# Patient Record
Sex: FEMALE | Race: WHITE | NOT HISPANIC OR LATINO | Employment: UNEMPLOYED | URBAN - METROPOLITAN AREA
[De-identification: names, ages, dates, MRNs, and addresses within clinical notes are randomized per-mention and may not be internally consistent; named-entity substitution may affect disease eponyms.]

---

## 2022-12-01 ENCOUNTER — TRANSFERRED RECORDS (OUTPATIENT)
Dept: HEALTH INFORMATION MANAGEMENT | Facility: CLINIC | Age: 62
End: 2022-12-01

## 2023-03-03 ENCOUNTER — MEDICAL CORRESPONDENCE (OUTPATIENT)
Dept: HEALTH INFORMATION MANAGEMENT | Facility: CLINIC | Age: 63
End: 2023-03-03
Payer: COMMERCIAL

## 2023-03-06 ENCOUNTER — TRANSCRIBE ORDERS (OUTPATIENT)
Dept: OTHER | Age: 63
End: 2023-03-06

## 2023-03-06 DIAGNOSIS — R10.9 ABDOMINAL PAIN: Primary | ICD-10-CM

## 2023-03-08 ENCOUNTER — TELEPHONE (OUTPATIENT)
Dept: GASTROENTEROLOGY | Facility: CLINIC | Age: 63
End: 2023-03-08
Payer: COMMERCIAL

## 2023-03-08 NOTE — TELEPHONE ENCOUNTER
Health Call Center    Phone Message    May a detailed message be left on voicemail: yes     Reason for Call: Appointment Intake    Referring Provider Name: Tri Capellan MD  Diagnosis and/or Symptoms: Abdominal pain [R10.9]    Per triage notes, patient is to be seen as a GI Urgent, but is currently scheduled on 04/19/2023 with Dr. Prachi Posadas. Current appointment is outside requested time frame. Please review for further follow up per scheduling guidelines. Thanks!     Action Taken: Message routed to:  [facility] ([facility] GI    Travel Screening: Not Applicable

## 2023-03-09 NOTE — TELEPHONE ENCOUNTER
Pt called back. Pt is now rescheduled to see Tamika Grewal on 3/17/2023 at 8am for an in-person clinic visit.

## 2023-03-09 NOTE — TELEPHONE ENCOUNTER
REFERRAL INFORMATION:    Referring Provider:  Dr. Tri Capellan    Referring Clinic:  [facility]    Reason for Visit/Diagnosis: abd pain     FUTURE VISIT INFORMATION:    Appointment Date: 04-19-23    Appointment Time: @ 10am      NOTES STATUS DETAILS   OFFICE NOTE from Referring Provider Care Everywhere - [facility] 03-06-23 Dr. Tri Capellan   OFFICE NOTE from Other Specialist N/A    HOSPITAL DISCHARGE SUMMARY/  ED VISITS Care everywhere - [facility] 03-02-23 Dr. Ang Whitt  03-02-23 Dr. Sheeba Rubin  01-28-23 Dr. Kristina Gottlieb   OPERATIVE REPORT N/A    MEDICATION LIST Internal         ENDOSCOPY  Care everywhere - [facility] EGD: 06-16-22   COLONOSCOPY Care everywhere - [facility] 04-12-22   ERCP N/A    EUS N/A    STOOL TESTING Care everywhere - epic 09-19-19   PERTINENT LABS Care everywhere - epic C-diff 09-02-20, 08-06-20, 09-19-19, 09-18-19   PATHOLOGY REPORTS (RELATED) Care everywhere - epic 12-01-22, 06-16-22   IMAGING (CT, MRI, EGD, MRCP, Small Bowel Follow Through/SBT, MR/CT Enterography) Care everywhere - [facility] EGD: 06-16-22  CT abd: 03-02-23, 06-09-22 03-09-23 requested images to be pushed from [facility] @ 11:22am   3/15/23 9AM received images in PACS - [facility]

## 2023-03-16 NOTE — PROGRESS NOTES
GI CLINIC VISIT    CC/REFERRING MD:  Tri Capellan  REASON FOR CONSULTATION: abdominal pain    ASSESSMENT/PLAN:  #Abdminal Pain  #Chronic gastritis with intestinal metaplasia (30% involvement overall) and focal activity. Hpylori negative  #Irregular bowel pattern  Pt with intermittent mid-abdominal/left sided abdominal pain with two episodes of significant worsening prompting ED visits.  Notes that abdominal pain started around time bowel pattern changed after previous colonoscopy which was normal though patient was told she had a tortuous colon.  No alternating between hard and liquid stools with feeling of incomplete evacuation.  Work-up done by primary including EGD which showed erythema in the gastric antrum with mucosal prominence/abnormality and hiatal hernia, esophagus and duodenum were noraml. Biopsies showed chronic intestinal metaplasia (30% involvement), negative for h. Pylori, no duodenal abnornalities (celiac). CT done 6/2022 which did not show acute abnormalities. She had gastric emptying study 7/2022 which was normal.  She underwent cholecystectomy in 12/1/2022 as this was thought to be contributing to her pain after HIDA showed potential biliary dyskinesia with gallbladder EF of 26%, though no change in abdominal pain after this procedure.  During ED visit lipase and LFTs were normal though mild biliary dilation was noted on CT without any other acute abdominal abnormalities.  Differential for symptoms include hypersensitivity with chronic gastritis, bile acid reflux, functional dyspepsia, high stool burden with potential overflow, IBS-M versus IBS-C, versus possible retained stone though less likely given normal LFTs.  We will start with increasing omeprazole to 40 mg twice daily to see if this assists with pain.  We will also complete EGD with mapping for intestinal metaplasia seen (H. pylori was negative, no frequent NSAID use).  Additionally we will try to regulate bowel pattern with start of  soluble fiber supplementation per below.  Lastly I will reach out to the pancreatic and biliary team regarding CT findings and any other potential work-up.  Future considerations include CTA to assess for potential ischemic disease though lower suspicion, lactose-free trial, SIBO breath testing, trial of neuromodulation with TCA (currently taking duloxetine and bupropion which have been helpful for mood but not pain - collaborate with prescribing provider prior to starting).    Plan:  -- Cut back on soda  -- Increase omeprazole to 40 mg twice daily  -- Continue dicyclomine as needed with meals  -- Start soluble fiber supplementation with Citrucel powder - this should be taken daily. Start with 1/2 tablespoon mixed with large glass of water. Slowly increase dose by 1/2 tablespoon every 1-2 weeks until desired stool consistency is achieved (up to 2-3 tablespoons per day). For example start with 1/2 tablespoon, then after a week incease to 1 tablespoon, then 1 1/2 tablespoons, then 2 tablespoons, etc.  -- schedule uppper endoscopy  -- Talk to panc bili team      Colorectal cancer screening: last colonoscopy 2022 normal, repeat in 10 years (2032) for screening purposes    RTC 3 months    Thank you for this consultation.  It was a pleasure to participate in the care of this patient; please contact us with any further questions.     85 Minutes was spent on the date of the encounter during chart review, history and exam, documentation, and further activities as noted       Tamika Grewal PA-C, RD  Division of Gastroenterology, Hepatology & Nutrition  [facility]        HPI  Velma Hendricks is a 63 year old female with a history of HTN, arthralgias, spinal stenosis with chronic back pain, depression, biliary dyskinesia s/p cholecystectomy (121/1/22)  who presents for evaluation of abdominal pain. They are new to the [facility] GI clinic and this is my first encounter with the patient.     Ms. Hendricks reports that she  "started having abdominal pain in mid to left abdomen without radiation in April 2022.  Described as intermittent, crampy/achy pain that would last 30 minutes to a couple hours and subside.  She could go days without experiencing pain, but sometimes has up to 2-3 episodes of pain within a day.  No association with food or bowel movements.  No aggravating or alleviating factors.  She notes that this started at time of screening colonoscopy in April.  Colonoscopy was normal, no evidence of inflammation or dysplasia, no biopsies were collected.  She does note that the endoscopist commented that she had a tortuous colon.  For further work up of pain she had EGD 6/2022 which showed erythema in the gastric antrum with mucosal prominence/abnormality and hiatal hernia, esophagus and duodenum were noraml. Biopsies showed chronic intestinal metaplasia (30% involvement), negative for h. Pylori, no duodenal abnornalities (celiac). CT done 6/2022 which did not show acute abnormalities. She had gastric emptying study 7/2022 which was normal.  She underwent cholecystectomy in 12/1/2022 as this was thought to be contributing to her pain after HIDA showed potential biliary dyskinesia with gallbladder EF of 26%.  Reports no change in abdominal pain after cholecystectomy.    She has had 2 episodes of more severe abdominal pain prompting ED visit in January and then just a few weeks ago.  This is in the same location and mid abdomen but more sharp in nature and feels like she had a \"punch to the stomach\". No radiation or association with eating/stools. These episodes are associated with nausea. CT imaging was unrevealing for source of symptoms, did show mild dilatation of the common bile duct and central intrahepatic bile ducts, thought to be secondary to the prior cholecystectomy given normal liver function tests, otherwise normal liver. Pain improved with droperidol in January, compazine during recent ED visit.    Currently taking " dicyclomine TID and omeprazole 20 mg BID without much improvement.    She notes that she did have a change in her bowel pattern around the time that the abdominal pain started.  Previously would have soft-hard formed stool 1-2 times per day, then started having more variable stool consistency between hard stools to mush/liquid.  Currently having 2 stools per day, Pierson stool 2-7.  Sometimes stools are urgent but denies incontinence or fecal leakage.  Typically does not feel empty with bowel movements, when she does feel fully evacuated after a large stool this will sometimes immediately induce more abdominal cramping.  Denies hematochezia or melena.   Cholestryramine tried for loose stools with primary without change.    Endorses intermittent bloating and excess flatus. No eructation. No GERD symptoms. No dysphagia or odynophagia. No hematemesis, hematochezia, or melena.    Denies use of NSAIDs. Notes that she drinks 3-4 can of coca cola  per day.     Reports weight loss 140 lb (3.5 years) -- > 117 lb in past 3.5 years with low weight of 112 lb, recently stable.       ROS:    No fevers or chills  + weight loss  No blurry vision, double vision or change in vision  No sore throat  No lymphadenopathy  No headache, paraesthesias, or weakness in a limb  No shortness of breath or wheezing  No chest pain or pressure  + arthralgias or myalgias  No rashes or skin changes  No odynophagia or dysphagia  No BRBPR, hematochezia, melena  No dysuria, frequency or urgency  No hot/cold intolerance or polyria  No anxiety or depression    PROBLEM LIST  There are no problems to display for this patient.      PERTINENT PAST MEDICAL HISTORY:  HTN  Arthralgia  Depression  Spinal stenosis, chronic back pain    No past medical history on file.    PREVIOUS SURGERIES:  Cholecystectomy   Back surgery x 3   x 3  No past surgical history on file.    PREVIOUS ENDOSCOPY:  EGD 22  Findings:   No gross lesions were noted in the upper  third of the esophagus, in the middle third of the esophagus and in the lower third of the esophagus.   A hiatal hernia was present.   Localized mild inflammation characterized by erythema was found in the gastric antrum. There was some mucosal prominence/abnormality in the gastric antrum which was biopsied with a cold biopsy forceps for histology.   No gross lesions were noted in the second portion of the duodenum.   There was a lesion/mucosal abnormality in the duodenal bulb which was biopsied with a cold biopsy forceps for histology.   Impression:              - No gross lesions in the upper third of the esophagus, in the middle third of the esophagus and in the lower third of the esophagus.   - Hiatal hernia.   - Gastritis/mucosal abnormality in the gastric antrum. Biopsied.   - No gross lesions in the second portion of the duodenum even though there was a lesion in the duodenal bulb which was biopsied    A.  Small bowel, duodenum, bulb, biopsy:    No diagnostic abnormality  B.  Stomach, antrum, biopsy:    Chronic gastritis with intestinal metaplasia (30% involvement overall) and focal activity    Negative for Helicobacter pylori by H. pylori immunohistochemical study    Colonoscopy 4/12/22:  Findings:   The entire examined colon appeared normal on direct and retroflexion views.   Impression:              - The entire examined colon is normal on direct and retroflexion views.   - No specimens collected.    ALLERGIES:   Not on File    PERTINENT MEDICATIONS:  No current outpatient medications on file.  No other NSAID/anticoagulation reported by patient.  No other OTC/herbal/supplements reported by patient.  Multivitamin    SOCIAL HISTORY:  Tobacco: no  Alcohol: no  Drugs Use: no      Social History     Socioeconomic History     Marital status:      Spouse name: Not on file     Number of children: Not on file     Years of education: Not on file     Highest education level: Not on file   Occupational History      Not on file   Tobacco Use     Smoking status: Not on file     Smokeless tobacco: Not on file   Substance and Sexual Activity     Alcohol use: Not on file     Drug use: Not on file     Sexual activity: Not on file   Other Topics Concern     Not on file   Social History Narrative     Not on file     Social Determinants of Health     Financial Resource Strain: Not on file   Food Insecurity: Not on file   Transportation Needs: Not on file   Physical Activity: Not on file   Stress: Not on file   Social Connections: Not on file   Intimate Partner Violence: Not on file   Housing Stability: Not on file       FAMILY HISTORY:  FH of CRC: no  FH of IBD: no  No Colon/Panc/Esophageal/other GI CA. No IBD or Celiac Disease. No other Autoimmune, Liver, or Thyroid disease.    No family history on file.    Past/family/social history reviewed and no changes    PHYSICAL EXAMINATION:  Constitutional: AAOx3, cooperative, pleasant, not dyspneic/diaphoretic, no acute distress  Vitals reviewed: There were no vitals taken for this visit.  Wt:   Wt Readings from Last 2 Encounters:   No data found for Wt      Eyes: Sclera anicteric/injected  Ears/nose/mouth/throat: Normal oropharynx without ulcers or exudate, mucus membranes moist, hearing intact  Neck: supple, thyroid normal size  CV: No edema  Respiratory: Unlabored breathing  Lymph: No axillary, submandibular, supraclavicular or inguinal lymphadenopathy  Abd: Nondistended, +bs, no hepatosplenomegaly, slightly tender to light and deep palpation in LLQ, no peritoneal signs  Skin: warm, perfused, no jaundice  Psych: Normal affect  MSK: Normal gait      PERTINENT STUDIES:  CT A/P (w/ contrast) 3/6/23:  FINDINGS:   LOWER CHEST: Benign calcified granulomas in the left lung base and minimal atelectasis   .   HEPATOBILIARY: Increase in mild dilatation of the common bile duct and central intrahepatic bile ducts which may be secondary to the prior cholecystectomy given the recent normal liver  function tests. Otherwise normal liver.     PANCREAS: Normal.     SPLEEN: Normal.     ADRENAL GLANDS: Mild thickening of left adrenal gland without suspicious adrenal nodule is stable.     KIDNEYS/BLADDER: Kidneys are partially obscured posteriorly due to metallic artifact related to the hip arthroplasties. No suspicious renal lesion or hydronephrosis. Normal bladder.     BOWEL: The bowel is normal in caliber and wall thickness. No inflammatory changes or ascites. Normal retrocecal appendix. Tiny fat-containing umbilical hernia.     LYMPH NODES: Normal.     VASCULATURE: Moderate aortic calcifications without aneurysmal dilatation.     PELVIC ORGANS: There is present. No pelvic mass or pelvic fluid collection is seen.     MUSCULOSKELETAL: Mild scoliosis and degenerative changes of the spine predominantly involving the facets with multilevel posterior metallic fusion and vertebroplasty changes. Postoperative changes of the iliac bones with screw tracts.     IMPRESSION:   1.  No acute abnormality identified in the abdomen or pelvis. No definitive findings to explain abdominal pain.     2.  Mild prominence of the central intrahepatic bile ducts and common bile duct has increased from previous imaging although may be due to the interval cholecystectomy given the recent normal liver function tests.      Gastric Emptying Study 7/13/22:  FINDINGS: The stomach is scintigraphically normal. No evidence of gastroesophageal reflux.     PATIENT'S RETENTION: 1 hour = 77%, 2 hours = 42%, 4 hours = 0%   NORMAL RETENTION: 1 hour = <90%, 2 hours = <60%, 4 hours = <10%     IMPRESSION:   Normal gastric emptying.  HIDA Scan 7/11/22:  FINDINGS: Normal radionuclide activity in liver, gallbladder, bile ducts, and small bowel. No evidence of cystic or common duct obstruction or intrinsic liver disease. Gallbladder ejection fraction measures 26%, which is below the normal range of 35% or greater.     IMPRESSION:     Findings suspicious for  biliary dyskinesia.

## 2023-03-17 ENCOUNTER — OFFICE VISIT (OUTPATIENT)
Dept: GASTROENTEROLOGY | Facility: CLINIC | Age: 63
End: 2023-03-17
Attending: FAMILY MEDICINE
Payer: COMMERCIAL

## 2023-03-17 VITALS
BODY MASS INDEX: 22.22 KG/M2 | SYSTOLIC BLOOD PRESSURE: 173 MMHG | HEIGHT: 61 IN | HEART RATE: 85 BPM | DIASTOLIC BLOOD PRESSURE: 90 MMHG | OXYGEN SATURATION: 100 % | WEIGHT: 117.7 LBS

## 2023-03-17 DIAGNOSIS — R10.84 ABDOMINAL PAIN, GENERALIZED: Primary | ICD-10-CM

## 2023-03-17 DIAGNOSIS — R19.8 IRREGULAR BOWEL HABITS: ICD-10-CM

## 2023-03-17 DIAGNOSIS — K31.A11 INTESTINAL METAPLASIA OF ANTRUM OF STOMACH WITHOUT DYSPLASIA: ICD-10-CM

## 2023-03-17 PROCEDURE — 99417 PROLNG OP E/M EACH 15 MIN: CPT | Performed by: DIETITIAN, REGISTERED

## 2023-03-17 PROCEDURE — 99205 OFFICE O/P NEW HI 60 MIN: CPT | Performed by: DIETITIAN, REGISTERED

## 2023-03-17 RX ORDER — LISINOPRIL 20 MG/1
20 TABLET ORAL
COMMUNITY
Start: 2022-11-22

## 2023-03-17 RX ORDER — BUPROPION HYDROCHLORIDE 150 MG/1
1 TABLET, EXTENDED RELEASE ORAL
COMMUNITY
Start: 2023-03-03

## 2023-03-17 RX ORDER — DULOXETIN HYDROCHLORIDE 30 MG/1
2 CAPSULE, DELAYED RELEASE ORAL DAILY
COMMUNITY
Start: 2021-04-14 | End: 2023-11-30

## 2023-03-17 RX ORDER — TRAZODONE HYDROCHLORIDE 50 MG/1
50 TABLET, FILM COATED ORAL
COMMUNITY
Start: 2021-05-05

## 2023-03-17 RX ORDER — DOXYCYCLINE 100 MG/1
100 CAPSULE ORAL
COMMUNITY
Start: 2022-07-25

## 2023-03-17 RX ORDER — DICYCLOMINE HYDROCHLORIDE 10 MG/1
CAPSULE ORAL
COMMUNITY
Start: 2022-08-02

## 2023-03-17 ASSESSMENT — PAIN SCALES - GENERAL: PAINLEVEL: NO PAIN (0)

## 2023-03-17 NOTE — NURSING NOTE
"Chief Complaint   Patient presents with     New Patient       Vitals:    03/17/23 0751   BP: (!) 173/90   Pulse: 85   SpO2: 100%   Weight: 53.4 kg (117 lb 11.2 oz)   Height: 1.549 m (5' 1\")       Body mass index is 22.24 kg/m .    Madelyn Rosa MA    "

## 2023-03-17 NOTE — PATIENT INSTRUCTIONS
It was a pleasure taking care of you today.  I've included a brief summary of our discussion and care plan from today's visit below.  Please review this information with your primary care provider.  ______________________________________________________________________    My recommendations are summarized as follows:  -- Increase omeprazole to 40 mg twice daily  -- Continue dicyclomine as needed with meals  -- Start soluble fiber supplementation with Citrucel or Metamucil powder - this should be taken daily. Start with 1/2 tablespoon mixed with large glass of water. Slowly increase dose by 1/2 tablespoon every 1-2 weeks until desired stool consistency is achieved (up to 2-3 tablespoons per day). For example start with 1/2 tablespoon, then after a week incease to 1 tablespoon, then 1 1/2 tablespoons, then 2 tablespoons, etc.  -- Schedule upper endoscopy    -- please see scheduling information provided below     Return to GI Clinic in 3 months to review your progress.    ______________________________________________________________________    How do I schedule labs, imaging studies, or procedures that were ordered in clinic today?     Labs: To schedule lab appointment at the [facility], use my chart or call [phone]. If you have a [facility] lab closer to home where you are regularly seen you can give them a call.     Procedures: If a colonoscopy, upper endoscopy, breath test, esophageal manometry, or pH impedence was ordered today, our endoscopy team will call you to schedule this. If you have not heard from our endoscopy team within a week, please call [phone] to schedule.     Imaging Studies: If you were scheduled for a CT scan, X-ray, MRI, ultrasound, HIDA scan or other imaging study, please call [phone] to have this scheduled.     Referral: If a referral to another specialty was ordered, expect a phone call or follow instructions above. If you have not heard from anyone regarding  your referral in a week, please call our clinic to check the status.     Who do I call with any questions after my visit?  Please be in touch if there are any further questions that arise following today's visit.  There are multiple ways to contact your gastroenterology care team.      During business hours, you may reach a Gastroenterology nurse at [phone]    To schedule or reschedule an appointment, please call [phone].     You can always send a secure message through [employer]Park.  [employer] messages are answered by your nurse or doctor typically within 24 hours.  Please allow extra time on weekends and holidays.      For urgent/emergent questions after business hours, you may reach the on-call GI Fellow by contacting the [facility]  at [phone].     How will I get the results of any tests ordered?    You will receive all of your results.  If you have signed up for [employer]It, any tests ordered at your visit will be available to you after your provider reviews them.  Typically this takes 1-2 weeks.  If there are urgent results that require a change in your care plan, your provider or nurse will call you to discuss the next steps.      What is [employer]Park?  [employer]Park is a secure way for you to access all of your healthcare records from the [facility].  It is a web based computer program, so you can sign on to it from any location.  It also allows you to send secure messages to your care team.  I recommend signing up for PickPark access if you have not already done so and are comfortable with using a computer.      How to I schedule a follow-up visit?  If you did not schedule a follow-up visit today, please call [phone] to schedule a follow-up office visit.      Sincerely,    Tamika Grewal PA-C  Division of Gastroenterology, Hepatology & Nutrition  [facility]

## 2023-03-28 ENCOUNTER — TELEPHONE (OUTPATIENT)
Dept: GASTROENTEROLOGY | Facility: CLINIC | Age: 63
End: 2023-03-28
Payer: COMMERCIAL

## 2023-03-28 NOTE — TELEPHONE ENCOUNTER
Screening Questions  BLUE  KIND OF PREP RED  LOCATION [review exclusion criteria] GREEN  SEDATION TYPE        N Are you active on mychart?       MIKE NICHOLSON Ordering/Referring Provider?        MEDICARE  What type of coverage do you have?     N Have you had a positive covid test in the last 14 days?     22.5 1. BMI  [BMI 40+ - review exclusion criteria]    Y  2. Are you able to give consent for your medical care? [IF NO,RN REVIEW]          N  3. Are you taking any prescription pain medications on a routine schedule   (ex narcotics: oxycodone, roxicodone, oxycontin,  and percocet)? [RN Review]          3a. EXTENDED PREP What kind of prescription?     N 4. Do you have any chemical dependencies such as alcohol, street drugs, or methadone?        **If yes 3- 5 , please schedule with MAC sedation.**          IF YES TO ANY 6 - 10 - HOSPITAL SETTING ONLY.     N 6.   Do you need assistance transferring?     N 7.   Have you had a heart or lung transplant?    N 8.   Are you currently on dialysis?   N 9.   Do you use daily home oxygen?   N 10. Do you take nitroglycerin?   10a.  If yes, how often?     11. [FEMALES]  N Are you currently pregnant?    11a.  If yes, how many weeks? [ Greater than 12 weeks, OR NEEDED]    N 12. Do you have Pulmonary Hypertension? *NEED PAC APPT AT [facility] w/ MAC*     N 13. [review exclusion criteria]  Do you have any implantable devices in your body (pacemaker, defib, LVAD)?    N 14. In the past 6 months, have you had any heart related issues including cardiomyopathy or heart attack?     14a.  If yes, did it require cardiac stenting if so when?     N 15. Have you had a stroke or Transient ischemic attack (TIA - aka  mini stroke ) within 6 months?      N 16. Do you have mod to severe Obstructive Sleep Apnea?  [Hospital only]    N 17. Do you have SEVERE AND UNCONTROLLED asthma? *NEED PAC APPT AT [facility] w/MAC*     18. Are you currently taking any blood thinners?     18a. No. Continue to 19.   18b.  "Yes/no Blood Thinner: No [CONTINUE TO #19]    N 19. Do you take the medication Phentermine?    19a. If yes, \"Hold for 7 days before procedure.  Please consult your prescribing provider if you have questions about holding this medication.\"     N  20. Do you have chronic kidney disease?      N  21. Do you have a diagnosis of diabetes?     N  22. On a regular basis do you go 3-5 days between bowel movements?      23. Preferred LOCAL Pharmacy for Pre Prescription    [ LIST ONLY ONE PHARMACY]     [facility] [id number] [place] [address]        - CLOSING REMINDERS -    Informed patient they will need an adult    Cannot take any type of public or medical transportation alone    Conscious Sedation- Needs  for 6 hours after the procedure       MAC/General-Needs  for 24 hours after procedure    Pre-Procedure Covid test to be completed [[facility] PCR Testing Required]    Confirmed Nurse will call to complete assessment       - SCHEDULING DETAILS -  N Hospital Setting Required? If yes, what is the exclusion?:    ZAY  Surgeon    05/18/2023  Date of Procedure  Upper Endoscopy [EGD]  Type of Procedure Scheduled  [facility]   Which Colonoscopy Prep was Sent?     CS Sedation Type     N PAC / Pre-op Required               "

## 2023-04-19 ENCOUNTER — PRE VISIT (OUTPATIENT)
Dept: GASTROENTEROLOGY | Facility: CLINIC | Age: 63
End: 2023-04-19

## 2023-05-04 ENCOUNTER — TELEPHONE (OUTPATIENT)
Dept: GASTROENTEROLOGY | Facility: CLINIC | Age: 63
End: 2023-05-04
Payer: COMMERCIAL

## 2023-05-04 NOTE — TELEPHONE ENCOUNTER
Attempted to contact patient regarding upcoming Upper endoscopy (EGD) procedure on 5/18/23 for pre assessment questions. No answer.     Left message to return call to [phone] #4    Discuss Covid policy and designated  policy.    Pre op exam? N/A    Arrival time: 0945. Procedure time: 1045    [facility]; [address] [place] [address], [address], [place] [address]    Sedation type: Conscious sedation     NSAIDs? No NSAID medications per patient's medication list.  RN will verify with pre-assessment call.    Anticoagulants: No    Electronic implanted devices? No    Diabetic? No    Indication for procedure: chronic abdominal pain, intestinal metaplasia- needs f/u mapping    Prep instructions previously sent via letter by scheduling team.       Sarika Tomas RN  Endoscopy Procedure Pre Assessment RN

## 2023-05-11 NOTE — TELEPHONE ENCOUNTER
Second attempt for pre-assessment prior to upcoming upper endoscopy (EGD) on 5.18.23     No answer.  Left message to return call [phone] #4    Natalie Jimenez RN  Endoscopy Procedure Pre Assessment RN

## 2023-05-11 NOTE — TELEPHONE ENCOUNTER
Pre assessment questions completed for upcoming Upper endoscopy (EGD) procedure scheduled on 5/18/23    COVID policy reviewed.     Pre-op exam? N/A    Reviewed procedural arrival time 0945, procedure time 1045 and facility location [facility]; [address] [place] [address], [address], [place] [address]    Designated  policy reviewed. Instructed to have someone stay 6 hours post procedure.     NSAIDs? Yes.  Ibuprofen (Advil, Motrin).  Holding interval of 1 day before procedure.    Anticoagulation/blood thinners? No    Electronic implanted devices? No    Diabetic? No    Reviewed procedure prep instructions.     Patient verbalized understanding and had no questions or concerns at this time.    Caro He, RN  Endoscopy Procedure Pre Assessment RN

## 2023-05-18 ENCOUNTER — ANESTHESIA EVENT (OUTPATIENT)
Dept: SURGERY | Facility: AMBULATORY SURGERY CENTER | Age: 63
End: 2023-05-18
Payer: COMMERCIAL

## 2023-05-18 ENCOUNTER — ANESTHESIA (OUTPATIENT)
Dept: SURGERY | Facility: AMBULATORY SURGERY CENTER | Age: 63
End: 2023-05-18
Payer: COMMERCIAL

## 2023-05-18 ENCOUNTER — HOSPITAL ENCOUNTER (OUTPATIENT)
Facility: AMBULATORY SURGERY CENTER | Age: 63
Discharge: HOME | End: 2023-05-18
Attending: STUDENT IN AN ORGANIZED HEALTH CARE EDUCATION/TRAINING PROGRAM
Payer: COMMERCIAL

## 2023-05-18 VITALS
HEIGHT: 60 IN | WEIGHT: 120 LBS | OXYGEN SATURATION: 98 % | RESPIRATION RATE: 16 BRPM | BODY MASS INDEX: 23.56 KG/M2 | TEMPERATURE: 97 F | HEART RATE: 108 BPM | SYSTOLIC BLOOD PRESSURE: 140 MMHG | DIASTOLIC BLOOD PRESSURE: 78 MMHG

## 2023-05-18 VITALS — HEART RATE: 117 BPM

## 2023-05-18 LAB — UPPER GI ENDOSCOPY: NORMAL

## 2023-05-18 PROCEDURE — 43239 EGD BIOPSY SINGLE/MULTIPLE: CPT

## 2023-05-18 PROCEDURE — 88305 TISSUE EXAM BY PATHOLOGIST: CPT | Mod: TC | Performed by: STUDENT IN AN ORGANIZED HEALTH CARE EDUCATION/TRAINING PROGRAM

## 2023-05-18 PROCEDURE — 88305 TISSUE EXAM BY PATHOLOGIST: CPT | Mod: 26 | Performed by: PATHOLOGY

## 2023-05-18 RX ORDER — ONDANSETRON 2 MG/ML
4 INJECTION INTRAMUSCULAR; INTRAVENOUS
Status: DISCONTINUED | OUTPATIENT
Start: 2023-05-18 | End: 2023-05-18 | Stop reason: HOSPADM

## 2023-05-18 RX ORDER — ONDANSETRON 2 MG/ML
4 INJECTION INTRAMUSCULAR; INTRAVENOUS EVERY 6 HOURS PRN
Status: DISCONTINUED | OUTPATIENT
Start: 2023-05-18 | End: 2023-05-19 | Stop reason: HOSPADM

## 2023-05-18 RX ORDER — PROPOFOL 10 MG/ML
INJECTION, EMULSION INTRAVENOUS CONTINUOUS PRN
Status: DISCONTINUED | OUTPATIENT
Start: 2023-05-18 | End: 2023-05-18

## 2023-05-18 RX ORDER — KETAMINE HYDROCHLORIDE 10 MG/ML
INJECTION INTRAMUSCULAR; INTRAVENOUS PRN
Status: DISCONTINUED | OUTPATIENT
Start: 2023-05-18 | End: 2023-05-18

## 2023-05-18 RX ORDER — FLUMAZENIL 0.1 MG/ML
0.2 INJECTION, SOLUTION INTRAVENOUS
Status: ACTIVE | OUTPATIENT
Start: 2023-05-18 | End: 2023-05-18

## 2023-05-18 RX ORDER — NALOXONE HYDROCHLORIDE 0.4 MG/ML
0.2 INJECTION, SOLUTION INTRAMUSCULAR; INTRAVENOUS; SUBCUTANEOUS
Status: DISCONTINUED | OUTPATIENT
Start: 2023-05-18 | End: 2023-05-19 | Stop reason: HOSPADM

## 2023-05-18 RX ORDER — LIDOCAINE 40 MG/G
CREAM TOPICAL
Status: DISCONTINUED | OUTPATIENT
Start: 2023-05-18 | End: 2023-05-18 | Stop reason: HOSPADM

## 2023-05-18 RX ORDER — PROPOFOL 10 MG/ML
INJECTION, EMULSION INTRAVENOUS PRN
Status: DISCONTINUED | OUTPATIENT
Start: 2023-05-18 | End: 2023-05-18

## 2023-05-18 RX ORDER — NALOXONE HYDROCHLORIDE 0.4 MG/ML
0.4 INJECTION, SOLUTION INTRAMUSCULAR; INTRAVENOUS; SUBCUTANEOUS
Status: DISCONTINUED | OUTPATIENT
Start: 2023-05-18 | End: 2023-05-19 | Stop reason: HOSPADM

## 2023-05-18 RX ORDER — PROCHLORPERAZINE MALEATE 10 MG
10 TABLET ORAL EVERY 6 HOURS PRN
Status: DISCONTINUED | OUTPATIENT
Start: 2023-05-18 | End: 2023-05-19 | Stop reason: HOSPADM

## 2023-05-18 RX ORDER — ONDANSETRON 4 MG/1
4 TABLET, ORALLY DISINTEGRATING ORAL EVERY 6 HOURS PRN
Status: DISCONTINUED | OUTPATIENT
Start: 2023-05-18 | End: 2023-05-19 | Stop reason: HOSPADM

## 2023-05-18 RX ORDER — SODIUM CHLORIDE, SODIUM LACTATE, POTASSIUM CHLORIDE, CALCIUM CHLORIDE 600; 310; 30; 20 MG/100ML; MG/100ML; MG/100ML; MG/100ML
INJECTION, SOLUTION INTRAVENOUS CONTINUOUS PRN
Status: DISCONTINUED | OUTPATIENT
Start: 2023-05-18 | End: 2023-05-18

## 2023-05-18 RX ADMIN — KETAMINE HYDROCHLORIDE 20 MG: 10 INJECTION INTRAMUSCULAR; INTRAVENOUS at 11:01

## 2023-05-18 RX ADMIN — PROPOFOL 150 MCG/KG/MIN: 10 INJECTION, EMULSION INTRAVENOUS at 11:01

## 2023-05-18 RX ADMIN — PROPOFOL 40 MG: 10 INJECTION, EMULSION INTRAVENOUS at 11:05

## 2023-05-18 RX ADMIN — SODIUM CHLORIDE, SODIUM LACTATE, POTASSIUM CHLORIDE, CALCIUM CHLORIDE: 600; 310; 30; 20 INJECTION, SOLUTION INTRAVENOUS at 10:58

## 2023-05-18 RX ADMIN — PROPOFOL 40 MG: 10 INJECTION, EMULSION INTRAVENOUS at 11:00

## 2023-05-18 NOTE — ANESTHESIA POSTPROCEDURE EVALUATION
Patient: Velma Hendricks    Procedure: Procedure(s):  Esophagoscopy, gastroscopy, duodenoscopy (EGD), combined       Anesthesia Type:  MAC    Note:  Disposition: Outpatient   Postop Pain Control: Uneventful            Sign Out: Well controlled pain   PONV: No   Neuro/Psych: Uneventful            Sign Out: Acceptable/Baseline neuro status   Airway/Respiratory: Uneventful            Sign Out: Acceptable/Baseline resp. status   CV/Hemodynamics: Uneventful            Sign Out: Acceptable CV status; No obvious hypovolemia; No obvious fluid overload   Other NRE: NONE   DID A NON-ROUTINE EVENT OCCUR?            Last vitals:  Vitals Value Taken Time   /78 05/18/23 1150   Temp 36.1  C (97  F) 05/18/23 1150   Pulse 108 05/18/23 1123   Resp 16 05/18/23 1150   SpO2 98 % 05/18/23 1150       Electronically Signed By: Fredo Rich MD  May 18, 2023  2:01 PM

## 2023-05-18 NOTE — H&P
Velma Hendricks  [id number]  female  63 year old      Reason for procedure/surgery: EGD for abdominal pain, follow up intestinal metaplasia    There is no problem list on file for this patient.      Past Surgical History:  No past surgical history on file.    Past Medical History: No past medical history on file.    Social History:   Social History     Tobacco Use     Smoking status: Never     Smokeless tobacco: Never   Vaping Use     Vaping status: Not on file   Substance Use Topics     Alcohol use: Not on file       Family History: No family history on file.    Allergies:   Allergies   Allergen Reactions     Miconazole Other (See Comments) and Hives     Penicillins Other (See Comments) and Hives     Cephalexin Other (See Comments) and Itching     Sulfa Antibiotics Hives       Active Medications:   Current Outpatient Medications   Medication Sig Dispense Refill     buPROPion (WELLBUTRIN SR) 150 MG 12 hr tablet Take 1 tablet by mouth daily at 2 pm       dicyclomine (BENTYL) 10 MG capsule   See Instructions, Instructions: TAKE 1-2 CAPS BY MOUTH 3 TIMES DAILY 30 MINUTES BEFORE MEALS, # 90 cap(s), 0 Refill(s), Type: Maintenance, Pharmacy: [id number], TAKE 1-2 CAPS BY MOUTH 3 TIMES DAILY 30 MINUTES BEFORE MEALS, 60, in, 12/20...       doxycycline hyclate (VIBRAMYCIN) 100 MG capsule Take 100 mg by mouth       DULoxetine (CYMBALTA) 30 MG capsule Take 2 capsules by mouth daily       lisinopril (ZESTRIL) 20 MG tablet Take 20 mg by mouth       naloxone (NARCAN) 4 MG/0.1ML nasal spray Spray 4 mg in nostril       omeprazole (PRILOSEC) 20 MG DR capsule        traZODone (DESYREL) 50 MG tablet Take 50 mg by mouth         Systemic Review:   CONSTITUTIONAL: NEGATIVE for fever, chills, change in weight  ENT/MOUTH: NEGATIVE for ear, mouth and throat problems  RESP: NEGATIVE for significant cough or SOB  CV: NEGATIVE for chest pain, palpitations or peripheral edema    Physical Examination:   Vital Signs: There were no  vitals taken for this visit.  GENERAL: healthy, alert and no distress  NECK: no adenopathy, no asymmetry, masses, or scars  RESP: lungs clear to auscultation - no rales, rhonchi or wheezes  CV: regular rate and rhythm, normal S1 S2, no S3 or S4, no murmur, click or rub, no peripheral edema and peripheral pulses strong  ABDOMEN: soft, nontender, no hepatosplenomegaly, no masses and bowel sounds normal  MS: no gross musculoskeletal defects noted, no edema      Plan: Appropriate to proceed as scheduled.      Barbara Damon MD  5/18/2023    PCP:  No primary care provider on file.

## 2023-05-18 NOTE — ANESTHESIA CARE TRANSFER NOTE
Patient: Velma Hendricks    Procedure: Procedure(s):  Esophagoscopy, gastroscopy, duodenoscopy (EGD), combined       Diagnosis: Abdominal pain, generalized [R10.84]  Diagnosis Additional Information: No value filed.    Anesthesia Type:   MAC     Note:    Oropharynx: spontaneously breathing  Level of Consciousness: drowsy  Oxygen Supplementation: room air    Independent Airway: airway patency satisfactory and stable  Dentition: dentition unchanged  Vital Signs Stable: post-procedure vital signs reviewed and stable    Patient transferred to: Phase II    Handoff Report: Identifed the Patient, Identified the Reponsible Provider, Reviewed the pertinent medical history, Discussed the surgical course, Reviewed Intra-OP anesthesia mangement and issues during anesthesia, Set expectations for post-procedure period and Allowed opportunity for questions and acknowledgement of understanding      Vitals:  Vitals Value Taken Time   /42 05/18/23 1123   Temp 36.1  C (97  F) 05/18/23 1123   Pulse 108 05/18/23 1123   Resp 16 05/18/23 1123   SpO2 95 % 05/18/23 1123       Electronically Signed By: ALFONSO Carpenter CRNA  May 18, 2023  11:30 AM

## 2023-05-18 NOTE — ANESTHESIA PREPROCEDURE EVALUATION
Anesthesia Pre-Procedure Evaluation    Patient: Velma Hendricks   MRN: [id number] : 1960        Procedure : Procedure(s):  Esophagoscopy, gastroscopy, duodenoscopy (EGD), combined          No past medical history on file.   No past surgical history on file.   Allergies   Allergen Reactions     Miconazole Other (See Comments) and Hives     Penicillins Other (See Comments) and Hives     Cephalexin Other (See Comments) and Itching     Sulfa Antibiotics Hives      Social History     Tobacco Use     Smoking status: Never     Smokeless tobacco: Never   Vaping Use     Vaping status: Not on file   Substance Use Topics     Alcohol use: Not on file      Wt Readings from Last 1 Encounters:   23 54.4 kg (120 lb)        Anesthesia Evaluation            ROS/MED HX  ENT/Pulmonary:       Neurologic:       Cardiovascular:     (+) hypertension-----    METS/Exercise Tolerance:     Hematologic:       Musculoskeletal:       GI/Hepatic:     (+) GERD,     Renal/Genitourinary:       Endo:       Psychiatric/Substance Use:       Infectious Disease:       Malignancy:       Other:      (+) , H/O Chronic Pain,        Physical Exam    Airway        Mallampati: II       Respiratory Devices and Support         Dental           Cardiovascular          Rhythm and rate: regular     Pulmonary           breath sounds clear to auscultation           OUTSIDE LABS:  CBC: No results found for: WBC, HGB, HCT, PLT  BMP: No results found for: NA, POTASSIUM, CHLORIDE, CO2, BUN, CR, GLC  COAGS: No results found for: PTT, INR, FIBR  POC: No results found for: BGM, HCG, HCGS  HEPATIC: No results found for: ALBUMIN, PROTTOTAL, ALT, AST, GGT, ALKPHOS, BILITOTAL, BILIDIRECT, ALBIN  OTHER: No results found for: PH, LACT, A1C, SALINAS, PHOS, MAG, LIPASE, AMYLASE, TSH, T4, T3, CRP, SED    Anesthesia Plan    ASA Status:  2   NPO Status:  NPO Appropriate    Anesthesia Type: MAC.     - Reason for MAC: immobility needed              Consents    Anesthesia Plan(s)  and associated risks, benefits, and realistic alternatives discussed. Questions answered and patient/representative(s) expressed understanding.    - Discussed:     - Discussed with:  Patient         Postoperative Care            Comments:                Fredo Rich MD

## 2023-05-18 NOTE — LETTER
May 19, 2023      Velma LEE Ruthie  [address] [place] [address]        Dear ,    We are writing to inform you of your test results.    The biopsies returned and showed something called intestinal metaplasia. This was also seen on your previous biopsies. This may imply that you are at very slightly higher risk than average for the development of tumors in your stomach, however the significance is unclear and the risk is likely very low. There is some controversy regarding whether this finding requires any specific follow-up, however some guidelines suggest that you have regularly scheduled follow-up endoscopic examinations for screening purposes.     To be on the safe side, I would recommend that you consider a repeat endoscopy examination in 3 years. Otherwise there is nothing to do at this time. There was no evidence of infection in your stomach.     Resulted Orders   Surgical Pathology Exam   Result Value Ref Range    Case Report       Surgical Pathology Report                         Case: [id number]                                  Authorizing Provider:  Barbara Damon MD          Collected:           05/18/2023 11:07 AM          Ordering Location:     [facility] [place] [facility] OR  Received:            05/18/2023 11:45 AM                                 [place]                                                                  Pathologist:           Sander Aguilar DO                                                            Specimens:   A) - Other, Dueodenum Polyp Biopsies                                                                B) - Other, Antrum Lesser Curvature Biopsy                                                          C) - Other, Antrum Greater Curvature Biopsy                                                         D) - Other, Incisura Biopsy                                                                         E) - Other, Body Lesser Curvature Biopsy                               "                               F) - Other, Body Greater Curvature Biopsy                                                  Final Diagnosis       A.  DUODENUM, POLYP:  - Polypoid duodenal mucosa with peptic-type duodenopathy  - No evidence of celiac disease    B.  STOMACH, ANTRUM LESSER CURVATURE, BIOPSY:  - Reactive gastropathy with mild chronic inactive gastritis and intestinal metaplasia (complete type)  - No H. pylori-like organisms identified on routine staining    C.  STOMACH, ANTRUM GREATER CURVATURE, BIOPSY:  - Reactive gastropathy with mild chronic inactive gastritis  - No H. pylori-like organisms identified on routine staining  - No intestinal metaplasia identified    D.  STOMACH,INCISURA, BIOPSY:  - Reactive gastropathy with mild chronic inactive gastritis  - No H. pylori-like organisms identified on routine staining  - No intestinal metaplasia identified    E.  STOMACH, BODY LESSER CURVATURE, BIOPSY:  - Unremarkable body-type mucosa  - No H. pylori-like organisms identified on routine staining  - No intestinal metaplasia identified    F.  STOMACH, BODY GREATER CURVATURE, BIOPSY:  - Unremarkable body-type mucosa  - No H. pylori-like organisms identified on routine staining  - No intestinal metaplasia identified          Clinical Information       Patient is a 63-year-old female with epigastric abdominal pain presenting for follow-up of intestinal metaplasia  Procedure:  Esophagoscopy, gastroscopy, duodenoscopy (EGD), combined  Pre-op Diagnosis: Abdominal pain, generalized [R10.84]  Post-op Diagnosis: R10.84 - Abdominal pain, generalized [ICD-10-CM]      Gross Description       A(1). Other, Dueodenum Polyp Biopsies:  The specimen is received in formalin with proper patient identification, labeled \"duodenum polyp biopsy\".  The specimen consists of a 0.3 cm tan-pink soft tissue fragment.  Submitted in A1.   B(2). Other, Antrum Lesser Curvature Biopsy:  The specimen is received in formalin with proper patient " "identification, labeled \"antrum lesser curvature biopsy\".  The specimen consists of 3 tan-pink soft tissue fragments, 0.3 to 0.5 cm in greatest dimension.  Submitted in B1.   C(3). Other, Antrum Greater Curvature Biopsy:  The specimen is received in formalin with proper patient identification, labeled \"antrum greater curvature biopsy\".  The specimen consists of 4 tan-pink soft tissue fragments ranging from 0.2 to 0.6 cm in greatest dimension.  Submitted in C1.   D(4). Other, Incisura Biopsy:  The specimen is received in formalin with proper patient identification, labeled \"incisura biopsy\".  The specimen consists of 4 tan-pink soft tissue fragments, 0.1 to 0.5 cm in greatest dimension.  Submitted in D1.   E(5). Other, Body Lesser Curvature Biopsy:  The specimen is received in formalin with proper patient identification, labeled \"body lesser curvature biopsy\".  The specimen consists of 5 tan-brown soft tissue fragments ranging from 0.1 to 0.4 cm in greatest dimension.  Submitted in E1.   F(6). Other, Body Greater Curvature Biopsy:  The specimen is received in formalin with proper patient identification, labeled \"body greater curvature biopsy\".  The specimen consists of 5 tan-white soft tissue fragments, 0.2 to 0.3 cm in greatest dimension.  Submitted in F1.       Performing Labs       The technical component of this testing was completed at [facility]        Case Images         If you have any questions or concerns, please call the clinic at the number listed above.       Sincerely,      Barbara Damon MD            "

## 2023-05-19 LAB
PATH REPORT.COMMENTS IMP SPEC: NORMAL
PATH REPORT.COMMENTS IMP SPEC: NORMAL
PATH REPORT.FINAL DX SPEC: NORMAL
PATH REPORT.GROSS SPEC: NORMAL
PATH REPORT.RELEVANT HX SPEC: NORMAL
PHOTO IMAGE: NORMAL

## 2023-06-07 ENCOUNTER — TELEPHONE (OUTPATIENT)
Dept: GASTROENTEROLOGY | Facility: CLINIC | Age: 63
End: 2023-06-07
Payer: COMMERCIAL

## 2023-06-07 NOTE — TELEPHONE ENCOUNTER
Called to remind patient of their upcoming appointment with our GI clinic, on Friday, June 16th at 9:30am with Meera Flores . This appointment is scheduled as an in-person appt. Please arrive 15 minutes early to check in for your appointment. , if your appointment is virtual (video or telephone) you need to be in [place] for the visit. To reschedule or cancel patient to call [phone].    Edith Proctor

## 2023-06-16 ENCOUNTER — OFFICE VISIT (OUTPATIENT)
Dept: GASTROENTEROLOGY | Facility: CLINIC | Age: 63
End: 2023-06-16
Payer: COMMERCIAL

## 2023-06-16 VITALS
OXYGEN SATURATION: 99 % | WEIGHT: 114.8 LBS | HEART RATE: 85 BPM | DIASTOLIC BLOOD PRESSURE: 87 MMHG | HEIGHT: 61 IN | SYSTOLIC BLOOD PRESSURE: 145 MMHG | BODY MASS INDEX: 21.67 KG/M2

## 2023-06-16 DIAGNOSIS — R10.84 ABDOMINAL PAIN, GENERALIZED: Primary | ICD-10-CM

## 2023-06-16 DIAGNOSIS — R10.33 PERIUMBILICAL ABDOMINAL PAIN: ICD-10-CM

## 2023-06-16 PROCEDURE — 99215 OFFICE O/P EST HI 40 MIN: CPT | Performed by: PHYSICIAN ASSISTANT

## 2023-06-16 RX ORDER — OMEPRAZOLE 40 MG/1
40 CAPSULE, DELAYED RELEASE ORAL 2 TIMES DAILY
Qty: 180 CAPSULE | Refills: 1 | Status: SHIPPED | OUTPATIENT
Start: 2023-06-16 | End: 2023-09-14

## 2023-06-16 ASSESSMENT — PAIN SCALES - GENERAL: PAINLEVEL: NO PAIN (0)

## 2023-06-16 NOTE — PROGRESS NOTES
"Gastroenterology Visit for: Velma Hendricks 1960   MRN: [id number]     Reason for Visit:  chief complaint    Referred by: Grewal  / [address] [facility] [id number]  Patient Care Team:  Prachi Posadas MD as MD (Gastroenterology)  Tri Capellan MD as MD    History of Present Illness:   Velma Hendricks is a 63 year old female with a history of HTN, arthralgias, spinal stenosis with chronic back pain, depression, biliary dyskinesia s/p cholecystectomy (121/1/22)  who presents for evaluation of chronic abdominal pain. Velma is previously established with the [facility] clinic however this is my first encounter with the patient.     Interval History June 16, 2023:    Today Velma reports that she does not have daily stomach pains like she was previously having. She has had 3-4 additional attacks of abdominal pain since her last evaluation in March. During these attacks she states \"it feels like I am being punched in the stomach over and over and over.\" Eventually she falls asleep and when she awakes the symptoms had resolved. The pain is located in the upper periumbilical/epigastric region and is non radiating.     When symptoms start she will take Tylenol, TUMs, Omeprazole and Dicyclomine with intermittent relief in symptoms. With this the symptoms will not be as severe as they were previously. Overall episodes are shorter in duration.     No association with oral intake, defecation or postural changes. Does note a correlation with stress.     Weight has been stable for the past 3 - 6 months.     Was previously taking fiber as she did not see change with the use. She was taking 1 tablespoon per day. Currently Velma is stooling 1-2 times daily that is soft and formed more often than not. Intermittently will have [place] Stool Scale Type 5.     History of 3 caesarian sections and cholecystectomy.     Denies weight loss, nausea, emesis, dysphagia, odynophagia, substernal chest pain, heartburn, regurgitation, " abdominal pain, diarrhea, constipation (< 3 stools per week), nocturnal stooling, incontinence of feces, steatorrhea, melena, hematochezia and BRBR.     Denies use of ETOH and tobacco products. No recreational drug use.     No family history or GI related malignancy (esophageal, gastric, pancreatic, liver or colon) or family history of IBD/celiac disease.     Velma discloses that her mother is currently dying and has hours if not days to live.    ------------------------------------------------------------------------------------------------------------------------------------------------------     DOE Grewal 3/17/2023:   Velma Hendricks is a 63 year old female with a history of HTN, arthralgias, spinal stenosis with chronic back pain, depression, biliary dyskinesia s/p cholecystectomy (121/1/22)  who presents for evaluation of abdominal pain. They are new to the [facility] GI clinic and this is my first encounter with the patient.      Ms. Hendricks reports that she started having abdominal pain in mid to left abdomen without radiation in April 2022.  Described as intermittent, crampy/achy pain that would last 30 minutes to a couple hours and subside.  She could go days without experiencing pain, but sometimes has up to 2-3 episodes of pain within a day.  No association with food or bowel movements.  No aggravating or alleviating factors.  She notes that this started at time of screening colonoscopy in April.  Colonoscopy was normal, no evidence of inflammation or dysplasia, no biopsies were collected.  She does note that the endoscopist commented that she had a tortuous colon.  For further work up of pain she had EGD 6/2022 which showed erythema in the gastric antrum with mucosal prominence/abnormality and hiatal hernia, esophagus and duodenum were noraml. Biopsies showed chronic intestinal metaplasia (30% involvement), negative for h. Pylori, no duodenal abnornalities (celiac). CT done 6/2022 which did not show acute  "abnormalities. She had gastric emptying study 7/2022 which was normal.  She underwent cholecystectomy in 12/1/2022 as this was thought to be contributing to her pain after HIDA showed potential biliary dyskinesia with gallbladder EF of 26%.  Reports no change in abdominal pain after cholecystectomy.     She has had 2 episodes of more severe abdominal pain prompting ED visit in January and then just a few weeks ago.  This is in the same location and mid abdomen but more sharp in nature and feels like she had a \"punch to the stomach\". No radiation or association with eating/stools. These episodes are associated with nausea. CT imaging was unrevealing for source of symptoms, did show mild dilatation of the common bile duct and central intrahepatic bile ducts, thought to be secondary to the prior cholecystectomy given normal liver function tests, otherwise normal liver. Pain improved with droperidol in January, compazine during recent ED visit.     Currently taking dicyclomine TID and omeprazole 20 mg BID without much improvement.     She notes that she did have a change in her bowel pattern around the time that the abdominal pain started.  Previously would have soft-hard formed stool 1-2 times per day, then started having more variable stool consistency between hard stools to mush/liquid.  Currently having 2 stools per day, [place] stool 2-7.  Sometimes stools are urgent but denies incontinence or fecal leakage.  Typically does not feel empty with bowel movements, when she does feel fully evacuated after a large stool this will sometimes immediately induce more abdominal cramping.  Denies hematochezia or melena.   Cholestryramine tried for loose stools with primary without change.     Endorses intermittent bloating and excess flatus. No eructation. No GERD symptoms. No dysphagia or odynophagia. No hematemesis, hematochezia, or melena.     Denies use of NSAIDs. Notes that she drinks 3-4 can of coca cola  per day. "      Reports weight loss 140 lb (3.5 years) -- > 117 lb in past 3.5 years with low weight of 112 lb, recently stable.        Esophageal Questionnaire(s)    BEDQ Questionnaire       View : No data to display.                   View : No data to display.                Eckardt Questionnaire       View : No data to display.                Promis 10 Questionnaire       View : No data to display.                    STUDIES & PROCEDURES:    EGD:     5/18/2023                                                                            Findings:        The examined esophagus was normal.        The Z-line was regular.        Diffuse moderately erythematous mucosa without bleeding was found in the        gastric antrum. Biopsies were taken with a cold forceps for histology.        Normal but mildly atrophic mucosa was found on the greater curvature of        the gastric body, on the lesser curvature of the gastric body and at the        incisura. Biopsies were taken with a cold forceps for histology.        The cardia and gastric fundus were normal on retroflexion.        A single 4 mm sessile polyp with no bleeding was found in the duodenal        bulb. Biopsies were taken with a cold forceps for histology.        The exam of the duodenum was otherwise normal.                                                                                     Impression:            - Normal esophagus.                          - Z-line regular.                          - Erythematous mucosa in the antrum. Biopsied.                          - Normal mucosa was found in the gastric body (greater                          curvature), in the gastric body (lesser curvature) and                          in the incisura. Biopsied.                          - A single duodenal polyp. Biopsied.     Final Diagnosis   A.  DUODENUM, POLYP:  - Polypoid duodenal mucosa with peptic-type duodenopathy  - No evidence of celiac disease    B.  STOMACH, ANTRUM  LESSER CURVATURE, BIOPSY:  - Reactive gastropathy with mild chronic inactive gastritis and intestinal metaplasia (complete type)  - No H. pylori-like organisms identified on routine staining    C.  STOMACH, ANTRUM GREATER CURVATURE, BIOPSY:  - Reactive gastropathy with mild chronic inactive gastritis  - No H. pylori-like organisms identified on routine staining  - No intestinal metaplasia identified    D.  STOMACH,INCISURA, BIOPSY:  - Reactive gastropathy with mild chronic inactive gastritis  - No H. pylori-like organisms identified on routine staining  - No intestinal metaplasia identified    E.  STOMACH, BODY LESSER CURVATURE, BIOPSY:  - Unremarkable body-type mucosa  - No H. pylori-like organisms identified on routine staining  - No intestinal metaplasia identified    F.  STOMACH, BODY GREATER CURVATURE, BIOPSY:  - Unremarkable body-type mucosa  - No H. pylori-like organisms identified on routine staining  - No intestinal metaplasia identified          06/16/22  Findings:   No gross lesions were noted in the upper third of the esophagus, in the middle third of the esophagus and in the lower third of the esophagus.   A hiatal hernia was present.   Localized mild inflammation characterized by erythema was found in the gastric antrum. There was some mucosal prominence/abnormality in the gastric antrum which was biopsied with a cold biopsy forceps for histology.   No gross lesions were noted in the second portion of the duodenum.   There was a lesion/mucosal abnormality in the duodenal bulb which was biopsied with a cold biopsy forceps for histology.     Impression:              - No gross lesions in the upper third of the esophagus, in the middle third of the esophagus and in the lower third of the esophagus.   - Hiatal hernia.   - Gastritis/mucosal abnormality in the gastric antrum. Biopsied.   - No gross lesions in the second portion of the duodenum even though there was a lesion in the duodenal bulb which was  biopsied     A.  Small bowel, duodenum, bulb, biopsy:    No diagnostic abnormality    B.  Stomach, antrum, biopsy:    Chronic gastritis with intestinal metaplasia (30% involvement overall) and focal activity    Negative for Helicobacter pylori by H. pylori immunohistochemical study      Colonoscopy:    Colonoscopy 4/12/22 [facility]  Findings:   The entire examined colon appeared normal on direct and retroflexion views.   Impression:              - The entire examined colon is normal on direct and retroflexion views.   - No specimens collected.     EndoFLIP directed at the UES or LES (8cm (EF-325) balloon length or 16cm (EF-322) balloon length):   Date:  8cm balloon  Balloon inflation Balloon pressure CSA (mm^2) DI (mm^2/mmHg) Dmin (mm) Compliance   20 (ladmark ID)        30        40        50           16cm balloon  Balloon inflation Balloon pressure CSA (mm^2) DI (mm^2/mmHg) Dmin (mm) Compliance   30 (ladmark ID)        40        50        60        70           High Resolution Manometry:    PH/Impedance:     Bravo:    CT:    3/2/2023 CT AP W Contrast   IMPRESSION:   1.  No acute abnormality identified in the abdomen or pelvis. No definitive findings to explain abdominal pain.     2.  Mild prominence of the central intrahepatic bile ducts and common bile duct has increased from previous imaging although may be due to the interval cholecystectomy given the recent normal liver function tests.      Esophagram:    FL VSS:     GES:    U/S:     XRAY:    Other:       Prior medical records were reviewed including, but not limited to, notes from referring providers, lab work, radiographic tests, and other diagnostic tests. Pertinent results were summarized above.     History   No past medical history on file.    Past Surgical History:   Procedure Laterality Date     ESOPHAGOSCOPY, GASTROSCOPY, DUODENOSCOPY (EGD), COMBINED N/A 5/18/2023    Procedure: Esophagoscopy, gastroscopy, duodenoscopy (EGD), combined;  Surgeon:  Barbara Damon MD;  Location: [facility] OR       Social History     Socioeconomic History     Marital status:      Spouse name: Not on file     Number of children: Not on file     Years of education: Not on file     Highest education level: Not on file   Occupational History     Not on file   Tobacco Use     Smoking status: Never     Smokeless tobacco: Never   Vaping Use     Vaping status: Not on file   Substance and Sexual Activity     Alcohol use: Not on file     Drug use: Not on file     Sexual activity: Not on file   Other Topics Concern     Not on file   Social History Narrative     Not on file     Social Determinants of Health     Financial Resource Strain: Not on file   Food Insecurity: Not on file   Transportation Needs: Not on file   Physical Activity: Not on file   Stress: Not on file   Social Connections: Not on file   Intimate Partner Violence: Not on file   Housing Stability: Not on file       No family history on file.  Family history reviewed and edited as appropriate    Medications and Allergies:     Outpatient Encounter Medications as of 6/16/2023   Medication Sig Dispense Refill     buPROPion (WELLBUTRIN SR) 150 MG 12 hr tablet Take 1 tablet by mouth daily at 2 pm       dicyclomine (BENTYL) 10 MG capsule   See Instructions, Instructions: TAKE 1-2 CAPS BY MOUTH 3 TIMES DAILY 30 MINUTES BEFORE MEALS, # 90 cap(s), 0 Refill(s), Type: Maintenance, Pharmacy: [id number] [employer], TAKE 1-2 CAPS BY MOUTH 3 TIMES DAILY 30 MINUTES BEFORE MEALS, 60, in, 12/20...       doxycycline hyclate (VIBRAMYCIN) 100 MG capsule Take 100 mg by mouth       DULoxetine (CYMBALTA) 30 MG capsule Take 2 capsules by mouth daily       lisinopril (ZESTRIL) 20 MG tablet Take 20 mg by mouth       naloxone (NARCAN) 4 MG/0.1ML nasal spray Spray 4 mg in nostril       omeprazole (PRILOSEC) 20 MG DR capsule        traZODone (DESYREL) 50 MG tablet Take 50 mg by mouth       No facility-administered encounter medications on file as of  6/16/2023.        Allergies   Allergen Reactions     Miconazole Other (See Comments) and Hives     Penicillins Other (See Comments) and Hives     Cephalexin Other (See Comments) and Itching     Sulfa Antibiotics Hives        Review of systems:  A full 10 point review of systems was obtained and was negative except for the pertinent positives and negatives stated within the HPI.    Objective Findings:   Physical Exam:    Constitutional: There were no vitals taken for this visit.  General: Weeping/tearful throughout exam  Head: Atraumatic, normocephalic, no obvious abnormalities   Eyes: Sclera anicteric, no obvious conjunctival hemorrhage   Nose: Nares normal, no obvious malformation, no obvious rhinorrhea   Respiratory: Resting comfortably, no apparent distress, no cough.   Cardiovascular: RRR, No murmurs, rubs, or gallops  Gastrointestinal: Soft, mild-tenderness to the upper periumbilical region and LLQ. +Carrnetts signs in both location. No rigidity. Abdomen is soft/non distended.   Skin: No jaundice, no obvious rash  Neurologic: AAOx3, no obvious neurologic abnormality  Psychiatric: Normal Affect, appropriate mood  Extremities: No obvious edema, no obvious malformation     Labs, Radiology, Pathology     No results found for: WBC, HGB, PLT, CHOL, TRIG, HDL, LDLDIRECT, ALT, AST, NA, CREATININE, BUN, CO2, TSH, INR, GLUF     Liver Function Studies - No results for input(s): PROTTOTAL, ALBUMIN, BILITOTAL, ALKPHOS, AST, ALT, BILIDIRECT in the last 05065 hours.       There are no problems to display for this patient.     Assessment and Plan   Assessment/Plan:    Velma Hendricks is a 63 year old female with a history of HTN, arthralgias, spinal stenosis with chronic back pain, depression, biliary dyskinesia s/p cholecystectomy (121/1/22)  who presents for evaluation of chronic abdominal pain. Velma is previously established with the [facility] GI clinic however this is my first encounter with the patient.     Prior Evaluation  Includes:     6/2022 EGD which showed erythema in the gastric antrum with mucosal prominence/abnormality and hiatal hernia, esophagus and duodenum were normal. Biopsies showed chronic intestinal metaplasia (30% involvement), negative for H. Pylori, duodenal biopsies were benign and negative for celiac disease.    CT AP W Contrast 6/2022 without intraabdominal pathology to explain on going symptoms    Gastric emptying study 7/2022 which was normal.      She underwent cholecystectomy in 12/1/2022 as this was thought to be contributing to her pain after HIDA showed potential biliary dyskinesia with gallbladder EF of 26%, though no change in abdominal pain after this procedure.       3/2023 ED visit for abdominal pain labs performed include CBC, BMP, LFT, Lipase NML.  CTAP with contrast without acute normality within the abdomen or pelvis.  Mild central intrahepatic bile duct and common bile duct dilation increased from prior which is most consistent consistent with interval cholecystectomy in the setting of normal LFT.    5/2023 EGD with normal examination of the esophagus.  There was diffuse moderately erythematous mucosa without bleeding throughout the gastric antrum.  Normal but mildly atrophic mucosa was seen in the greater curvature of the gastric body, lesser curvature in the incisura. The gastric cardia/fundus were described to be normal.  Biopsies of the lesser curvature with reactive gastropathy and mild chronic inactive gastritis/intestinal metaplasia complete type.  Biopsies of the antrum/incisura with mild chronic active gastritis.  Biopsies of the greater and lesser curvature with unremarkable mucosa.  No H. pylori or additional intestinal metaplasia identified.  There was a 4 mm polyp within the duodenal bulb which was consistent with polypoid duodenal mucosa.  No evidence of celiac disease.    #Chronic Abdminal Pain  Today Velma reports that her symptoms have overall improved and that she is not having  daily abdominal pain like she previously was experiencing.  Additionally she reports overall improvement in her bowel habits now having [place] stool scale type IV stools with rare intermittent type V/VI stools. However, she continues to have acute exacerbations of nonradiating upper periumbilical/epigastric pain that last for approximately 1/2-hour. She does correlate episodes with increased life stressors.  There are no additional identifiable aggravating factors.  No association with oral intake, defecation or postural changes. She obtains relief with the use of Tylenol, Tums, Omeprazole 20 mg and Dicyclomine 10mg as well as sleep.  When she wakes symptoms have resolved.    Symptoms are likely largely driven by a disorder of the brain gut axis including hypersensitivity with chronic gastritis, functional dyspepsia versus IBS.  Musculoskeletal etiology should also be considered as positive Carnett's sign on physical examination however nature of symptoms is less consistent with musculoskeletal origin.  Additional differential should include bile acid reflux, and possible retained stone was previously considered with imaging findings however this is less likely given normal LFTs and CT findings are most consistent with post cholecystectomy state.     If no improvement after 8 - 12 weeks time use of Omeprazole would then consider use of FDguard for minimum of 4 weeks. If no improvement would consider initiation or alterations in current neuromodulation. Currently Velma is taking both Duloxetine and Bupropion which have been helpful for mood but not pain and if adjustments are made collaborate with prescribing provider would be needed prior to initiation.  Additional considerations would be to obtain evaluation with Meera Rondon with health psychology to which patient declined consultation today.    - Increase Omeprazole to 40mg twice daily. This is best taken on an empty stomach a least 30 - 60 minutes prior to  meals.   - Reflux friendly lifestyle modifications  - Restart daily fiber. Please start by taking daily fiber supplementation. Start with 1 - 2 teaspoons daily which can be slowly uptitrated to 2 - 3 tablespoons as needed   - Continue to use medication as you are for increased abdominal pain. Okay to use TUMs, Bentyl and Tylenol during these times. Please discontinue use of all NSAIDs.  - Consider consultation with Meera Rondon behavioral health specialist      #Intestinal Metaplasia - Complete Type   EGD 5/2023 with biopsies of the lesser curvature with reactive gastropathy and mild chronic inactive gastritis/intestinal metaplasia complete type.  Biopsies of the antrum/incisura with mild chronic active gastritis.  Biopsies of the greater and lesser curvature with unremarkable mucosa.  No H. pylori or additional intestinal metaplasia identified.     As Tom does not have extensive/incomplete gastrointestinal metaplasia or risk factors for the development of gastric cancer including first-degree relative with gastric cancer, H. pylori, pernicious anemia, racial/ethnic minorities ( Americas, Hispanics and ), first generation immigrants from high incident areas ([place]) additional screening is not recommended at this time.    #Colorectal Cancer Screening   Colonoscopy 2022 that was unremarkable. No family history of CRC. Per the most recent update by the [place] Multi-Society Task Force on Colorectal Cancer recall should be 10 years, 2023 or sooner if otherwise indicated.       Follow up plan:   Return to clinic 2 months and as needed.    The risks and benefits of my recommendations, as well as other treatment options were discussed with the patient and any available family today. All questions were answered.     o Follow up: As planned above. Today, I personally spent 35 minutes in direct face to face time with the patient, of which greater than 50% of the time was spent in patient education and  counseling as described above. Approximately 12 minutes were spent on indirect care associated with the patient's consultation including but not limited to review of: patient medical records to date, clinic visits, hospital records, lab results, imaging studies, procedural documentation, and coordinating care with other providers. The findings from this review are summarized in the above note. All of the above accounted for a cumulative time of 47 minutes and was performed on the date of service.     The patient verbalized understanding of the plan and was appreciative for the time spent and information provided during the office visit.         Meera Flores PA-C  Division of Gastroenterology, Hepatology, and Nutrition  [facility]       Documentation assisted by voice recognition and documentation system.

## 2023-06-16 NOTE — LETTER
"    6/16/2023         RE: Velma Hendricks  [address]  [facility] [address]        Dear Colleague,    Thank you for referring your patient, Velma Hendricks, to the [facility] [place]. Please see a copy of my visit note below.    Gastroenterology Visit for: Velma Hendricks 1960   MRN: [id number]     Reason for Visit:  chief complaint    Referred by: Grewal  / [facility] / [facility] [id number]  Patient Care Team:  Prachi Posadas MD as MD (Gastroenterology)  Tri Capellan MD as MD    History of Present Illness:   Velma Hendricks is a 63 year old female with a history of HTN, arthralgias, spinal stenosis with chronic back pain, depression, biliary dyskinesia s/p cholecystectomy (121/1/22)  who presents for evaluation of chronic abdominal pain. Velma is previously established with the [facility] GI clinic however this is my first encounter with the patient.     Interval History June 16, 2023:    Today Velma reports that she does not have daily stomach pains like she was previously having. She has had 3-4 additional attacks of abdominal pain since her last evaluation in March. During these attacks she states \"it feels like I am being punched in the stomach over and over and over.\" Eventually she falls asleep and when she awakes the symptoms had resolved. The pain is located in the upper periumbilical/epigastric region and is non radiating.     When symptoms start she will take Tylenol, TUMs, Omeprazole and Dicyclomine with intermittent relief in symptoms. With this the symptoms will not be as severe as they were previously. Overall episodes are shorter in duration.     No association with oral intake, defecation or postural changes. Does note a correlation with stress.     Weight has been stable for the past 3 - 6 months.     Was previously taking fiber as she did not see change with the use. She was taking 1 tablespoon per day. Currently Velma is stooling 1-2 times daily that is " soft and formed more often than not. Intermittently will have Pope Stool Scale Type 5.     History of 3 caesarian sections and cholecystectomy.     Denies weight loss, nausea, emesis, dysphagia, odynophagia, substernal chest pain, heartburn, regurgitation, abdominal pain, diarrhea, constipation (< 3 stools per week), nocturnal stooling, incontinence of feces, steatorrhea, melena, hematochezia and BRBR.     Denies use of ETOH and tobacco products. No recreational drug use.     No family history or GI related malignancy (esophageal, gastric, pancreatic, liver or colon) or family history of IBD/celiac disease.     Velma discloses that her mother is currently dying and has hours if not days to live.    ------------------------------------------------------------------------------------------------------------------------------------------------------     DOE Grewal 3/17/2023:   Velma Hendricks is a 63 year old female with a history of HTN, arthralgias, spinal stenosis with chronic back pain, depression, biliary dyskinesia s/p cholecystectomy (121/1/22)  who presents for evaluation of abdominal pain. They are new to the [facility] GI clinic and this is my first encounter with the patient.      Ms. Hendricks reports that she started having abdominal pain in mid to left abdomen without radiation in April 2022.  Described as intermittent, crampy/achy pain that would last 30 minutes to a couple hours and subside.  She could go days without experiencing pain, but sometimes has up to 2-3 episodes of pain within a day.  No association with food or bowel movements.  No aggravating or alleviating factors.  She notes that this started at time of screening colonoscopy in April.  Colonoscopy was normal, no evidence of inflammation or dysplasia, no biopsies were collected.  She does note that the endoscopist commented that she had a tortuous colon.  For further work up of pain she had EGD 6/2022 which showed erythema in the gastric  "antrum with mucosal prominence/abnormality and hiatal hernia, esophagus and duodenum were noraml. Biopsies showed chronic intestinal metaplasia (30% involvement), negative for h. Pylori, no duodenal abnornalities (celiac). CT done 6/2022 which did not show acute abnormalities. She had gastric emptying study 7/2022 which was normal.  She underwent cholecystectomy in 12/1/2022 as this was thought to be contributing to her pain after HIDA showed potential biliary dyskinesia with gallbladder EF of 26%.  Reports no change in abdominal pain after cholecystectomy.     She has had 2 episodes of more severe abdominal pain prompting ED visit in January and then just a few weeks ago.  This is in the same location and mid abdomen but more sharp in nature and feels like she had a \"punch to the stomach\". No radiation or association with eating/stools. These episodes are associated with nausea. CT imaging was unrevealing for source of symptoms, did show mild dilatation of the common bile duct and central intrahepatic bile ducts, thought to be secondary to the prior cholecystectomy given normal liver function tests, otherwise normal liver. Pain improved with droperidol in January, compazine during recent ED visit.     Currently taking dicyclomine TID and omeprazole 20 mg BID without much improvement.     She notes that she did have a change in her bowel pattern around the time that the abdominal pain started.  Previously would have soft-hard formed stool 1-2 times per day, then started having more variable stool consistency between hard stools to mush/liquid.  Currently having 2 stools per day, White stool 2-7.  Sometimes stools are urgent but denies incontinence or fecal leakage.  Typically does not feel empty with bowel movements, when she does feel fully evacuated after a large stool this will sometimes immediately induce more abdominal cramping.  Denies hematochezia or melena.   Cholestryramine tried for loose stools with " primary without change.     Endorses intermittent bloating and excess flatus. No eructation. No GERD symptoms. No dysphagia or odynophagia. No hematemesis, hematochezia, or melena.     Denies use of NSAIDs. Notes that she drinks 3-4 can of coca cola  per day.      Reports weight loss 140 lb (3.5 years) -- > 117 lb in past 3.5 years with low weight of 112 lb, recently stable.        Esophageal Questionnaire(s)    BEDQ Questionnaire       View : No data to display.                   View : No data to display.                Eckardt Questionnaire       View : No data to display.                Promis 10 Questionnaire       View : No data to display.                    STUDIES & PROCEDURES:    EGD:     5/18/2023                                                                            Findings:        The examined esophagus was normal.        The Z-line was regular.        Diffuse moderately erythematous mucosa without bleeding was found in the        gastric antrum. Biopsies were taken with a cold forceps for histology.        Normal but mildly atrophic mucosa was found on the greater curvature of        the gastric body, on the lesser curvature of the gastric body and at the        incisura. Biopsies were taken with a cold forceps for histology.        The cardia and gastric fundus were normal on retroflexion.        A single 4 mm sessile polyp with no bleeding was found in the duodenal        bulb. Biopsies were taken with a cold forceps for histology.        The exam of the duodenum was otherwise normal.                                                                                     Impression:            - Normal esophagus.                          - Z-line regular.                          - Erythematous mucosa in the antrum. Biopsied.                          - Normal mucosa was found in the gastric body (greater                          curvature), in the gastric body (lesser curvature) and                           in the incisura. Biopsied.                          - A single duodenal polyp. Biopsied.     Final Diagnosis   A.  DUODENUM, POLYP:  - Polypoid duodenal mucosa with peptic-type duodenopathy  - No evidence of celiac disease    B.  STOMACH, ANTRUM LESSER CURVATURE, BIOPSY:  - Reactive gastropathy with mild chronic inactive gastritis and intestinal metaplasia (complete type)  - No H. pylori-like organisms identified on routine staining    C.  STOMACH, ANTRUM GREATER CURVATURE, BIOPSY:  - Reactive gastropathy with mild chronic inactive gastritis  - No H. pylori-like organisms identified on routine staining  - No intestinal metaplasia identified    D.  STOMACH,INCISURA, BIOPSY:  - Reactive gastropathy with mild chronic inactive gastritis  - No H. pylori-like organisms identified on routine staining  - No intestinal metaplasia identified    E.  STOMACH, BODY LESSER CURVATURE, BIOPSY:  - Unremarkable body-type mucosa  - No H. pylori-like organisms identified on routine staining  - No intestinal metaplasia identified    F.  STOMACH, BODY GREATER CURVATURE, BIOPSY:  - Unremarkable body-type mucosa  - No H. pylori-like organisms identified on routine staining  - No intestinal metaplasia identified          06/16/22  Findings:   No gross lesions were noted in the upper third of the esophagus, in the middle third of the esophagus and in the lower third of the esophagus.   A hiatal hernia was present.   Localized mild inflammation characterized by erythema was found in the gastric antrum. There was some mucosal prominence/abnormality in the gastric antrum which was biopsied with a cold biopsy forceps for histology.   No gross lesions were noted in the second portion of the duodenum.   There was a lesion/mucosal abnormality in the duodenal bulb which was biopsied with a cold biopsy forceps for histology.     Impression:              - No gross lesions in the upper third of the esophagus, in the middle third of the  esophagus and in the lower third of the esophagus.   - Hiatal hernia.   - Gastritis/mucosal abnormality in the gastric antrum. Biopsied.   - No gross lesions in the second portion of the duodenum even though there was a lesion in the duodenal bulb which was biopsied     A.  Small bowel, duodenum, bulb, biopsy:  No diagnostic abnormality    B.  Stomach, antrum, biopsy:  Chronic gastritis with intestinal metaplasia (30% involvement overall) and focal activity  Negative for Helicobacter pylori by H. pylori immunohistochemical study      Colonoscopy:    Colonoscopy 4/12/22 [facility]  Findings:   The entire examined colon appeared normal on direct and retroflexion views.   Impression:              - The entire examined colon is normal on direct and retroflexion views.   - No specimens collected.     EndoFLIP directed at the UES or LES (8cm (EF-325) balloon length or 16cm (EF-322) balloon length):   Date:  8cm balloon  Balloon inflation Balloon pressure CSA (mm^2) DI (mm^2/mmHg) Dmin (mm) Compliance   20 (ladmark ID)        30        40        50           16cm balloon  Balloon inflation Balloon pressure CSA (mm^2) DI (mm^2/mmHg) Dmin (mm) Compliance   30 (ladmark ID)        40        50        60        70           High Resolution Manometry:    PH/Impedance:     Bravo:    CT:    3/2/2023 CT AP W Contrast   IMPRESSION:   1.  No acute abnormality identified in the abdomen or pelvis. No definitive findings to explain abdominal pain.     2.  Mild prominence of the central intrahepatic bile ducts and common bile duct has increased from previous imaging although may be due to the interval cholecystectomy given the recent normal liver function tests.      Esophagram:    FL VSS:     GES:    U/S:     XRAY:    Other:       Prior medical records were reviewed including, but not limited to, notes from referring providers, lab work, radiographic tests, and other diagnostic tests. Pertinent results were summarized above.      History   No past medical history on file.    Past Surgical History:   Procedure Laterality Date    ESOPHAGOSCOPY, GASTROSCOPY, DUODENOSCOPY (EGD), COMBINED N/A 5/18/2023    Procedure: Esophagoscopy, gastroscopy, duodenoscopy (EGD), combined;  Surgeon: Barbara Damon MD;  Location: [facility] OR       Social History     Socioeconomic History    Marital status:      Spouse name: Not on file    Number of children: Not on file    Years of education: Not on file    Highest education level: Not on file   Occupational History    Not on file   Tobacco Use    Smoking status: Never    Smokeless tobacco: Never   Vaping Use    Vaping status: Not on file   Substance and Sexual Activity    Alcohol use: Not on file    Drug use: Not on file    Sexual activity: Not on file   Other Topics Concern    Not on file   Social History Narrative    Not on file     Social Determinants of Health     Financial Resource Strain: Not on file   Food Insecurity: Not on file   Transportation Needs: Not on file   Physical Activity: Not on file   Stress: Not on file   Social Connections: Not on file   Intimate Partner Violence: Not on file   Housing Stability: Not on file       No family history on file.  Family history reviewed and edited as appropriate    Medications and Allergies:     Outpatient Encounter Medications as of 6/16/2023   Medication Sig Dispense Refill    buPROPion (WELLBUTRIN SR) 150 MG 12 hr tablet Take 1 tablet by mouth daily at 2 pm      dicyclomine (BENTYL) 10 MG capsule   See Instructions, Instructions: TAKE 1-2 CAPS BY MOUTH 3 TIMES DAILY 30 MINUTES BEFORE MEALS, # 90 cap(s), 0 Refill(s), Type: Maintenance, Pharmacy: [id number] [employer], TAKE 1-2 CAPS BY MOUTH 3 TIMES DAILY 30 MINUTES BEFORE MEALS, 60, in, 12/20...      doxycycline hyclate (VIBRAMYCIN) 100 MG capsule Take 100 mg by mouth      DULoxetine (CYMBALTA) 30 MG capsule Take 2 capsules by mouth daily      lisinopril (ZESTRIL) 20 MG tablet Take 20 mg by mouth       naloxone (NARCAN) 4 MG/0.1ML nasal spray Spray 4 mg in nostril      omeprazole (PRILOSEC) 20 MG DR capsule       traZODone (DESYREL) 50 MG tablet Take 50 mg by mouth       No facility-administered encounter medications on file as of 6/16/2023.        Allergies   Allergen Reactions    Miconazole Other (See Comments) and Hives    Penicillins Other (See Comments) and Hives    Cephalexin Other (See Comments) and Itching    Sulfa Antibiotics Hives        Review of systems:  A full 10 point review of systems was obtained and was negative except for the pertinent positives and negatives stated within the HPI.    Objective Findings:   Physical Exam:    Constitutional: There were no vitals taken for this visit.  General: Weeping/tearful throughout exam  Head: Atraumatic, normocephalic, no obvious abnormalities   Eyes: Sclera anicteric, no obvious conjunctival hemorrhage   Nose: Nares normal, no obvious malformation, no obvious rhinorrhea   Respiratory: Resting comfortably, no apparent distress, no cough.   Cardiovascular: RRR, No murmurs, rubs, or gallops  Gastrointestinal: Soft, mild-tenderness to the upper periumbilical region and LLQ. +Carrnetts signs in both location. No rigidity. Abdomen is soft/non distended.   Skin: No jaundice, no obvious rash  Neurologic: AAOx3, no obvious neurologic abnormality  Psychiatric: Normal Affect, appropriate mood  Extremities: No obvious edema, no obvious malformation     Labs, Radiology, Pathology     No results found for: WBC, HGB, PLT, CHOL, TRIG, HDL, LDLDIRECT, ALT, AST, NA, CREATININE, BUN, CO2, TSH, INR, GLUF     Liver Function Studies - No results for input(s): PROTTOTAL, ALBUMIN, BILITOTAL, ALKPHOS, AST, ALT, BILIDIRECT in the last 22019 hours.       There are no problems to display for this patient.     Assessment and Plan   Assessment/Plan:    Velma Hendricks is a 63 year old female with a history of HTN, arthralgias, spinal stenosis with chronic back pain, depression, biliary  dyskinesia s/p cholecystectomy (121/1/22)  who presents for evaluation of chronic abdominal pain. Velma is previously established with the [facility] GI clinic however this is my first encounter with the patient.     Prior Evaluation Includes:     6/2022 EGD which showed erythema in the gastric antrum with mucosal prominence/abnormality and hiatal hernia, esophagus and duodenum were normal. Biopsies showed chronic intestinal metaplasia (30% involvement), negative for H. Pylori, duodenal biopsies were benign and negative for celiac disease.    CT AP W Contrast 6/2022 without intraabdominal pathology to explain on going symptoms    Gastric emptying study 7/2022 which was normal.      She underwent cholecystectomy in 12/1/2022 as this was thought to be contributing to her pain after HIDA showed potential biliary dyskinesia with gallbladder EF of 26%, though no change in abdominal pain after this procedure.       3/2023 ED visit for abdominal pain labs performed include CBC, BMP, LFT, Lipase NML.  CTAP with contrast without acute normality within the abdomen or pelvis.  Mild central intrahepatic bile duct and common bile duct dilation increased from prior which is most consistent consistent with interval cholecystectomy in the setting of normal LFT.    5/2023 EGD with normal examination of the esophagus.  There was diffuse moderately erythematous mucosa without bleeding throughout the gastric antrum.  Normal but mildly atrophic mucosa was seen in the greater curvature of the gastric body, lesser curvature in the incisura. The gastric cardia/fundus were described to be normal.  Biopsies of the lesser curvature with reactive gastropathy and mild chronic inactive gastritis/intestinal metaplasia complete type.  Biopsies of the antrum/incisura with mild chronic active gastritis.  Biopsies of the greater and lesser curvature with unremarkable mucosa.  No H. pylori or additional intestinal metaplasia identified.  There was a 4 mm  polyp within the duodenal bulb which was consistent with polypoid duodenal mucosa.  No evidence of celiac disease.    #Chronic Abdminal Pain  Today Velma reports that her symptoms have overall improved and that she is not having daily abdominal pain like she previously was experiencing.  Additionally she reports overall improvement in her bowel habits now having Castella stool scale type IV stools with rare intermittent type V/VI stools. However, she continues to have acute exacerbations of nonradiating upper periumbilical/epigastric pain that last for approximately 1/2-hour. She does correlate episodes with increased life stressors.  There are no additional identifiable aggravating factors.  No association with oral intake, defecation or postural changes. She obtains relief with the use of Tylenol, Tums, Omeprazole 20 mg and Dicyclomine 10mg as well as sleep.  When she wakes symptoms have resolved.    Symptoms are likely largely driven by a disorder of the brain gut axis including hypersensitivity with chronic gastritis, functional dyspepsia versus IBS.  Musculoskeletal etiology should also be considered as positive Carnett's sign on physical examination however nature of symptoms is less consistent with musculoskeletal origin.  Additional differential should include bile acid reflux, and possible retained stone was previously considered with imaging findings however this is less likely given normal LFTs and CT findings are most consistent with post cholecystectomy state.     If no improvement after 8 - 12 weeks time use of Omeprazole would then consider use of FDguard for minimum of 4 weeks. If no improvement would consider initiation or alterations in current neuromodulation. Currently Velma is taking both Duloxetine and Bupropion which have been helpful for mood but not pain and if adjustments are made collaborate with prescribing provider would be needed prior to initiation.  Additional considerations would be to  obtain evaluation with Meera Rondon with health psychology to which patient declined consultation today.    - Increase Omeprazole to 40mg twice daily. This is best taken on an empty stomach a least 30 - 60 minutes prior to meals.   - Reflux friendly lifestyle modifications  - Restart daily fiber. Please start by taking daily fiber supplementation. Start with 1 - 2 teaspoons daily which can be slowly uptitrated to 2 - 3 tablespoons as needed   - Continue to use medication as you are for increased abdominal pain. Okay to use TUMs, Bentyl and Tylenol during these times. Please discontinue use of all NSAIDs.  - Consider consultation with Meera Rondon behavioral health specialist      #Intestinal Metaplasia - Complete Type   EGD 5/2023 with biopsies of the lesser curvature with reactive gastropathy and mild chronic inactive gastritis/intestinal metaplasia complete type.  Biopsies of the antrum/incisura with mild chronic active gastritis.  Biopsies of the greater and lesser curvature with unremarkable mucosa.  No H. pylori or additional intestinal metaplasia identified.     As Tom does not have extensive/incomplete gastrointestinal metaplasia or risk factors for the development of gastric cancer including first-degree relative with gastric cancer, H. pylori, pernicious anemia, racial/ethnic minorities ( Americas, Hispanics and ), first generation immigrants from high incident areas ([place]) additional screening is not recommended at this time.    #Colorectal Cancer Screening   Colonoscopy 2022 that was unremarkable. No family history of CRC. Per the most recent update by the [place] Multi-Society Task Force on Colorectal Cancer recall should be 10 years, 2023 or sooner if otherwise indicated.       Follow up plan:   Return to clinic 2 months and as needed.    The risks and benefits of my recommendations, as well as other treatment options were discussed with the patient and any available family  today. All questions were answered.     Follow up: As planned above. Today, I personally spent 35 minutes in direct face to face time with the patient, of which greater than 50% of the time was spent in patient education and counseling as described above. Approximately 12 minutes were spent on indirect care associated with the patient's consultation including but not limited to review of: patient medical records to date, clinic visits, hospital records, lab results, imaging studies, procedural documentation, and coordinating care with other providers. The findings from this review are summarized in the above note. All of the above accounted for a cumulative time of 47 minutes and was performed on the date of service.     The patient verbalized understanding of the plan and was appreciative for the time spent and information provided during the office visit.         Meera Flores PA-C  Division of Gastroenterology, Hepatology, and Nutrition  [facility]       Documentation assisted by voice recognition and documentation system.

## 2023-06-16 NOTE — NURSING NOTE
"Chief Complaint   Patient presents with     Follow Up       Vitals:    06/16/23 0904   BP: (!) 145/87   Pulse: 85   SpO2: 99%   Weight: 52.1 kg (114 lb 12.8 oz)   Height: 1.549 m (5' 1\")       Body mass index is 21.69 kg/m .    Madelyn Rosa MA    "

## 2023-06-16 NOTE — PATIENT INSTRUCTIONS
It was a pleasure taking care of you today.  I've included a brief summary of our discussion and care plan from today's visit below.  Please review this information with your primary care provider.  _______________________________________________________________________    My recommendations are summarized as follows:    - Increase Omeprazole to 40mg twice daily. This is best taken on an empty stomach a least 30 - 60 minutes prior to meals.   - Reflux friendly lifestyle modifications   - Restart daily fiber. Please start by taking daily fiber supplementation. Start with 1 - 2 teaspoons daily which can be slowly uptitrated to 2 - 3 tablespoons as needed   - Continue to use medication as you are for increased abdominal pain. Okay to use TUMs, Bentyl and Tylenol during these times. Please discontinue use of all NSAIDs.  - If no improvement after 8 - 12 weeks time use of Omeprazole would then consider use of FDguard for minimum of 4 weeks. If no improvement would consider initiation or alterations in current neuromodulation/anxiety/depression medications   - Consider consultation with Meera Rondon behavioral health specialist     Gastroesophageal Reflux Disease (GERD) Lifestyle Modifications:   If taking acid suppression therapy (PPI ie Pantoprazole, Lansoprazole, Omeprazole, Esomeprazole, Rabeprazole, Dexlansoprazole) it should be taken 30 - 60 minutes prior to meals on an empty stomach to have maximum effect  Avoid triggers for reflux such as coffee, chocolate, carbonated beverages, spicy foods, acidic foods (tomato based/citrus and foods with high fat content   Abstinence from alcohol and cessation of all tobacco products is recommended   Studies have shown that weight loss, exercise and maintaining a healthy BMI significantly reduce GERD symptoms   Remain upright while eating and immediately after meals  Do not eat or drink at least 3 hours prior to laying down supine/laying down for bed   Avoid late night/middle of  the night snacking    Consider obtaining a wedge pillow or elevating the head end of the bed while sleeping   Avoid sleeping right side down as this can place the lower part of the esophagus/lower esophageal sphincter in a dependent position that favors reflux   Attempting to eat smaller more frequent meals may improve symptoms       To schedule endoscopic procedures you may call: [phone]  To schedule radiology (imaging) tests you may call: [phone]  To schedule an ENT appointment you may call: [phone]    Please call my nurse Leola ([phone]), Tor[place] ([phone]) with any questions or concerns.      Return to GI Clinic in 2 months to review your progress.    _______________________________________________________________________    Who do I call with any questions after my visit?  Please be in touch if there are any further questions that arise following today's visit.  There are multiple ways to contact your gastroenterology care team.      During business hours, you may reach a Gastroenterology nurse at [phone] and choose option 3.       To schedule or reschedule an appointment, please call [phone].     You can always send a secure message through [employer].  [employer] messages are answered by your nurse or doctor typically within 24 hours.  Please allow extra time on weekends and holidays.      For urgent/emergent questions after business hours, you may reach the on-call GI Fellow by contacting the [facility]  at [phone].     How will I get the results of any tests ordered?    You will receive all of your results.  If you have signed up for [employer], any tests ordered at your visit will be available to you after your physician reviews them.  Typically this takes 1-2 weeks.  If there are urgent results that require a change in your care plan, your physician or nurse will call you to discuss the next steps.      What is [employer]?  [employer] is a secure way for you to  access all of your healthcare records from the [facility].  It is a web based computer program, so you can sign on to it from any location.  It also allows you to send secure messages to your care team.  I recommend signing up for [employer] access if you have not already done so and are comfortable with using a computer.      How to I schedule a follow-up visit?  If you did not schedule a follow-up visit today, please call [phone] to schedule a follow-up office visit.      If you feel you received exceptional care and are interested in supporting the clinical and research goals of Meera Flores PA-C or the Division of Gastroenterology, Hepatology, and Nutrition please contact [email or web address] from the [email or web address] to discuss opportunities to donate.    Sincerely,    Meera Flores PA-C  Division of Gastroenterology, Hepatology, and Nutrition  [facility]

## 2023-08-10 ENCOUNTER — TELEPHONE (OUTPATIENT)
Dept: GASTROENTEROLOGY | Facility: CLINIC | Age: 63
End: 2023-08-10
Payer: COMMERCIAL

## 2023-08-10 NOTE — TELEPHONE ENCOUNTER
Called to remind patient of their upcoming appointment with our GI clinic, on Thursday 8/17/2023 at 11:15AM with Meera Flores . This appointment is scheduled as an in-person appt. Please arrive 15 minutes early to check in for your appointment. , if your appointment is virtual (video or telephone) you need to be in [place] for the visit. To reschedule or cancel appointment patient needs to call [phone] option 1.  To confirm appointment patient advised to call back.     Madelyn Rosa MA

## (undated) DEVICE — SYR 30ML SLIP TIP W/O NDL 302833

## (undated) DEVICE — GOWN IMPERVIOUS 2XL BLUE

## (undated) DEVICE — ENDO FORCEP BX CAPTURA PRO SPIKE G50696

## (undated) DEVICE — SPECIMEN CONTAINER 3OZ W/FORMALIN 59901

## (undated) DEVICE — TUBING SUCTION MEDI-VAC 1/4"X20' N620A

## (undated) DEVICE — GLOVE EXAM NITRILE LG PF LATEX FREE 5064

## (undated) DEVICE — SOL WATER IRRIG 500ML BOTTLE 2F7113

## (undated) DEVICE — SUCTION MANIFOLD NEPTUNE 2 SYS 1 PORT 702-025-000

## (undated) DEVICE — ENDO BITE BLOCK ADULT OMNI-BLOC

## (undated) DEVICE — KIT ENDO TURNOVER/PROCEDURE CARRY-ON 101822

## (undated) DEVICE — SUCTION CATH AIRLIFE TRI-FLO W/CONTROL PORT 14FR  T60C